# Patient Record
(demographics unavailable — no encounter records)

---

## 2025-06-10 NOTE — PHYSICAL EXAM
[Well Developed] : well developed [Well Nourished] : well nourished [No Acute Distress] : no acute distress [Normal Conjunctiva] : normal conjunctiva [Normal Venous Pressure] : normal venous pressure [No Carotid Bruit] : no carotid bruit [Normal S1, S2] : normal S1, S2 [No Murmur] : no murmur [No Rub] : no rub [No Gallop] : no gallop [Clear Lung Fields] : clear lung fields [Good Air Entry] : good air entry [No Respiratory Distress] : no respiratory distress  [Soft] : abdomen soft [No Masses/organomegaly] : no masses/organomegaly [Non Tender] : non-tender [Normal Bowel Sounds] : normal bowel sounds [Normal Gait] : normal gait [No Edema] : no edema [No Cyanosis] : no cyanosis [No Clubbing] : no clubbing [No Varicosities] : no varicosities [No Rash] : no rash [No Skin Lesions] : no skin lesions [Moves all extremities] : moves all extremities [No Focal Deficits] : no focal deficits [Alert and Oriented] : alert and oriented [Normal Speech] : normal speech [Normal memory] : normal memory

## 2025-06-10 NOTE — REVIEW OF SYSTEMS
[Anxiety] : anxiety [Negative] : Neurological [FreeTextEntry5] : As Per HPI [FreeTextEntry6] : As Per HPI

## 2025-06-10 NOTE — ASSESSMENT
[FreeTextEntry1] : 90 year old female with PMH of former tobacco use (1ppd x 20 years), R breast cancer s/p modified radical mastectomy and reconstruction, HTN, anxiety and glaucoma who presents to establish cardiovascular care and for evaluation of exertional dyspnea and palpitations for the past 2 months. Symptoms occur when she exercises/swims, reportedly found to have a PVC on prior PCP ECG.  Plan for 7 day event monitor, exercise stress test, will obtain prior records for TTE. Continue losartan 25mg for BP

## 2025-06-10 NOTE — HISTORY OF PRESENT ILLNESS
[FreeTextEntry1] : Patient is a 90 year old female with PMH of former tobacco use (1ppd x 20 years), R breast cancer s/p modified radical mastectomy and reconstruction, HTN, anxiety and glaucoma who presents to establish cardiovascular care and for evaluation of exertional dyspnea and palpitations for the past 2 months.  Patient reports having palpitations intermittently, in addition to exertional dyspnea with swimming for the past 6-7 weeks.  Denies any chest pain or other exertional symptoms.  Patient is very active and swims and exercises frequently with excellent functional status. Of note, at a recent PCP visit in [place] patient was told that she had a PVC on her ECG, records currently unavailable. She also states that she had a TTE within the last few months. ECG obtained today with [facility]

## 2025-06-10 NOTE — DISCUSSION/SUMMARY
[EKG obtained to assist in diagnosis and management of assessed problem(s)] : EKG obtained to assist in diagnosis and management of assessed problem(s) [FreeTextEntry1] : 1) HTN - Continue losartan 25mg qD  2) Palpitations 7 day event monitor - Will obtain TTE   3) Exertional dyspnea -TTE at PCP in [place], will obtain records - Exercise Stress test